# Patient Record
Sex: FEMALE | Race: WHITE | NOT HISPANIC OR LATINO | URBAN - METROPOLITAN AREA
[De-identification: names, ages, dates, MRNs, and addresses within clinical notes are randomized per-mention and may not be internally consistent; named-entity substitution may affect disease eponyms.]

---

## 2019-06-17 ENCOUNTER — OFFICE (OUTPATIENT)
Dept: URBAN - METROPOLITAN AREA CLINIC 101 | Facility: CLINIC | Age: 29
End: 2019-06-17

## 2019-06-17 VITALS
DIASTOLIC BLOOD PRESSURE: 62 MMHG | SYSTOLIC BLOOD PRESSURE: 99 MMHG | WEIGHT: 122 LBS | HEART RATE: 87 BPM | TEMPERATURE: 98.1 F | HEIGHT: 64 IN

## 2019-06-17 DIAGNOSIS — K64.8 OTHER HEMORRHOIDS: ICD-10-CM

## 2019-06-17 DIAGNOSIS — E73.9 LACTOSE INTOLERANCE, UNSPECIFIED: ICD-10-CM

## 2019-06-17 DIAGNOSIS — K59.1 FUNCTIONAL DIARRHEA: ICD-10-CM

## 2019-06-17 DIAGNOSIS — K90.0 CELIAC DISEASE: ICD-10-CM

## 2019-06-17 PROCEDURE — 99244 OFF/OP CNSLTJ NEW/EST MOD 40: CPT

## 2019-06-17 RX ORDER — HYDROCORTISONE ACETATE 25 MG/1
SUPPOSITORY RECTAL
Qty: 28 | Refills: 2 | Status: ACTIVE
Start: 2019-06-17

## 2019-06-17 NOTE — SERVICEHPINOTES
KRISTINA HOOKER   is a   29   year old    female who is being seen in consultation at the request of    for follow up of celiac disease and complaints of hemorrhoids. She was diagnosed in 2011 with celiac disease based on duodenal biopsies. Her celiac profile was negative and she also had non-specific inflammation in the colon and ileum. Lactose testing was also positive. Since then she has been gluten free but not lactose free. She has intermittent loose stools, 2 BMs per day which range from BSS type 4 to 7.  She also notes hemorrhoids which cause BRBPR at times as well as itching and perianal discomfort. She denies any protruding hemorrhoids. She sometimes has painful defecation. She sits on the toilet for about 10-15 minutes because she sometimes has trouble defecating.  She tries to get plenty of fiber in her diet. She denies abdominal pain, nausea, vomiting, melena, constipation, loss of appetite, weight loss, heartburn, dysphagia.